# Patient Record
Sex: MALE | Race: WHITE | NOT HISPANIC OR LATINO | URBAN - METROPOLITAN AREA
[De-identification: names, ages, dates, MRNs, and addresses within clinical notes are randomized per-mention and may not be internally consistent; named-entity substitution may affect disease eponyms.]

---

## 2024-04-02 NOTE — PROGRESS NOTES
Subjective   Geraldo Edwards is a 73 y.o. male who presents for the following: Rash (Generalized on body.  Started a few months ago. Comes and goes.  Diagnosed with eczema about 5 years ago by PCP & Dr. Gu.  Currently takes oatmeal baths as needed for itching, Hydroxyzine as needed for itching/sleep, OTC oatmeal cream.  Previously prescribed medrol dose pack and a topical steroid (unsure of name). )      Objective   Well appearing patient in no apparent distress; mood and affect are within normal limits.    A full examination was performed including scalp, head, eyes, ears, nose, lips, neck, chest, axillae, abdomen, back, buttocks, bilateral upper extremities, bilateral lower extremities, hands, feet, fingers, toes, fingernails, and toenails. All findings within normal limits unless otherwise noted below.    Left Upper Back  Erythematous scaly annular plaques on arms, trunk, buttocks, legs, 70% BSA                Assessment/Plan   Tinea corporis    Related Procedures  Follow Up In Dermatology - Established Patient    Related Medications  fluconazole (Diflucan) 150 mg tablet  Take 1 tablet (150 mg) by mouth every 7 days for 28 days.    ketoconazole (NIZOral) 2 % shampoo  Apply topically every other day. Use as body wash daily in shower    Rash and other nonspecific skin eruption  Left Upper Back    Tinea corporis vs CTCL vs annular psoriasis vs other    -Discussed uncertain diagnosis of lesion and recommended biopsy to help clarify diagnosis. -Discussed R/B of procedure including risk of scar. Patient verbalized understanding and agreement with plan for biopsy today.    Most suspicious for tinea corporis. Advised starting ketoconazole 2% shampoo as body wash daily, and fluconazole 150mg once weekly x 4 weeks    Rtc 3 months or earlier prn based on biopsy results    Lesion biopsy - Left Upper Back  Type of biopsy: punch    Informed consent: discussed and consent obtained    Timeout: patient name, date of birth,  surgical site, and procedure verified    Procedure prep:  Patient was prepped and draped  Anesthesia: the lesion was anesthetized in a standard fashion    Anesthetic:  1% lidocaine w/ epinephrine 1-100,000 local infiltration  Punch size:  4 mm  Suture size:  5-0  Suture type: fast-absorbing plain gut    Suture removal (days):  0  Hemostasis achieved with: suture    Outcome: patient tolerated procedure well    Post-procedure details: sterile dressing applied and wound care instructions given    Dressing type: bandage and petrolatum      Specimen 1 - Dermatopathology- DERM LAB  Differential Diagnosis: r/o tinea corporis vs CTCL vs annular psoriasis vs other  Check Margins Yes/No?:    Comments:    Dermpath Lab: Routine Histopathology (formalin-fixed tissue)        Scribe Attestation  By signing my name below, I, Sarah Jules LPN , Scribe   attest that this documentation has been prepared under the direction and in the presence of Beto Craig MD.

## 2024-04-03 ENCOUNTER — OFFICE VISIT (OUTPATIENT)
Dept: DERMATOLOGY | Facility: CLINIC | Age: 74
End: 2024-04-03
Payer: COMMERCIAL

## 2024-04-03 DIAGNOSIS — B35.4 TINEA CORPORIS: Primary | ICD-10-CM

## 2024-04-03 DIAGNOSIS — R21 RASH AND OTHER NONSPECIFIC SKIN ERUPTION: ICD-10-CM

## 2024-04-03 PROCEDURE — 99204 OFFICE O/P NEW MOD 45 MIN: CPT | Performed by: STUDENT IN AN ORGANIZED HEALTH CARE EDUCATION/TRAINING PROGRAM

## 2024-04-03 PROCEDURE — 1159F MED LIST DOCD IN RCRD: CPT | Performed by: STUDENT IN AN ORGANIZED HEALTH CARE EDUCATION/TRAINING PROGRAM

## 2024-04-03 PROCEDURE — 11104 PUNCH BX SKIN SINGLE LESION: CPT | Performed by: STUDENT IN AN ORGANIZED HEALTH CARE EDUCATION/TRAINING PROGRAM

## 2024-04-03 PROCEDURE — 88312 SPECIAL STAINS GROUP 1: CPT | Performed by: DERMATOLOGY

## 2024-04-03 PROCEDURE — 88305 TISSUE EXAM BY PATHOLOGIST: CPT | Performed by: DERMATOLOGY

## 2024-04-03 RX ORDER — GLIMEPIRIDE 4 MG/1
4 TABLET ORAL
COMMUNITY
Start: 2023-06-01

## 2024-04-03 RX ORDER — ASPIRIN 81 MG/1
81 TABLET ORAL ONCE
COMMUNITY
Start: 2006-05-18

## 2024-04-03 RX ORDER — FLASH GLUCOSE SENSOR
11 KIT MISCELLANEOUS AS NEEDED
COMMUNITY
Start: 2024-03-29

## 2024-04-03 RX ORDER — METFORMIN HYDROCHLORIDE 750 MG/1
750 TABLET, EXTENDED RELEASE ORAL
COMMUNITY
Start: 2023-06-01

## 2024-04-03 RX ORDER — LISINOPRIL 2.5 MG/1
2.5 TABLET ORAL
COMMUNITY
Start: 2023-06-01

## 2024-04-03 RX ORDER — FLUCONAZOLE 150 MG/1
150 TABLET ORAL
Qty: 4 TABLET | Refills: 0 | Status: SHIPPED | OUTPATIENT
Start: 2024-04-03 | End: 2024-05-01

## 2024-04-03 RX ORDER — KETOCONAZOLE 20 MG/ML
SHAMPOO, SUSPENSION TOPICAL EVERY OTHER DAY
Qty: 120 ML | Refills: 3 | Status: SHIPPED | OUTPATIENT
Start: 2024-04-03 | End: 2024-04-03 | Stop reason: SDUPTHER

## 2024-04-03 RX ORDER — ATORVASTATIN CALCIUM 10 MG/1
10 TABLET, FILM COATED ORAL DAILY
COMMUNITY
Start: 2023-06-01

## 2024-04-03 RX ORDER — HYDROXYZINE HYDROCHLORIDE 25 MG/1
25 TABLET, FILM COATED ORAL AS NEEDED
COMMUNITY
Start: 2024-03-29

## 2024-04-03 RX ORDER — DM/P-EPHED/ACETAMINOPH/DOXYLAM 30-7.5/3
2 LIQUID (ML) ORAL
COMMUNITY
Start: 2024-03-29 | End: 2024-06-27

## 2024-04-03 RX ORDER — NICOTINE 7MG/24HR
1 PATCH, TRANSDERMAL 24 HOURS TRANSDERMAL EVERY 24 HOURS
COMMUNITY
Start: 2024-03-29 | End: 2024-09-25

## 2024-04-03 ASSESSMENT — DERMATOLOGY QUALITY OF LIFE (QOL) ASSESSMENT
WHAT SINGLE SKIN CONDITION LISTED BELOW IS THE PATIENT ANSWERING THE QUALITY-OF-LIFE ASSESSMENT QUESTIONS ABOUT: DERMATITIS
RATE HOW BOTHERED YOU ARE BY SYMPTOMS OF YOUR SKIN PROBLEM (EG, ITCHING, STINGING BURNING, HURTING OR SKIN IRRITATION): 5
ARE THERE EXCLUSIONS OR EXCEPTIONS FOR THE QUALITY OF LIFE ASSESSMENT: NO
DATE THE QUALITY-OF-LIFE ASSESSMENT WAS COMPLETED: 66933
RATE HOW EMOTIONALLY BOTHERED YOU ARE BY YOUR SKIN PROBLEM (FOR EXAMPLE, WORRY, EMBARRASSMENT, FRUSTRATION): 5
RATE HOW BOTHERED YOU ARE BY EFFECTS OF YOUR SKIN PROBLEMS ON YOUR ACTIVITIES (EG, GOING OUT, ACCOMPLISHING WHAT YOU WANT, WORK ACTIVITIES OR YOUR RELATIONSHIPS WITH OTHERS): 5

## 2024-04-03 ASSESSMENT — DERMATOLOGY PATIENT ASSESSMENT
ARE YOU AN ORGAN TRANSPLANT RECIPIENT: NO
DO YOU USE A TANNING BED: NO
HAVE YOU HAD OR DO YOU HAVE VASCULAR DISEASE: NO
DO YOU HAVE ANY NEW OR CHANGING LESIONS: NO
HAVE YOU HAD OR DO YOU HAVE A STAPH INFECTION: NO

## 2024-04-03 ASSESSMENT — PATIENT GLOBAL ASSESSMENT (PGA): PATIENT GLOBAL ASSESSMENT: PATIENT GLOBAL ASSESSMENT:  3 - MODERATE

## 2024-04-03 ASSESSMENT — ITCH NUMERIC RATING SCALE: HOW SEVERE IS YOUR ITCHING?: 7

## 2024-04-03 NOTE — Clinical Note
"The pharmacy requested a new script.  The original stated \"apply topically every other day. Use as a body wash daily in shower\"  Thank you!"

## 2024-04-08 DIAGNOSIS — B35.4 TINEA CORPORIS: ICD-10-CM

## 2024-04-08 LAB
LABORATORY COMMENT REPORT: NORMAL
PATH REPORT.FINAL DX SPEC: NORMAL
PATH REPORT.GROSS SPEC: NORMAL
PATH REPORT.RELEVANT HX SPEC: NORMAL
PATH REPORT.TOTAL CANCER: NORMAL

## 2024-04-09 RX ORDER — KETOCONAZOLE 20 MG/ML
SHAMPOO, SUSPENSION TOPICAL
Qty: 120 ML | Refills: 3 | Status: SHIPPED | OUTPATIENT
Start: 2024-04-09

## 2024-04-10 NOTE — RESULT ENCOUNTER NOTE
Eczematous pattern. This can be seen in setting of fungal infection or environmental or other sensitivities.   With anti-fungal regimen, how is he doing? If improving, continue current regimen.   If not improving, can start TAC 0.1% cream BID to affected areas and should RTC in next 2-6 weeks for re-evaluation.

## 2024-04-11 DIAGNOSIS — R21 RASH AND OTHER NONSPECIFIC SKIN ERUPTION: ICD-10-CM

## 2024-04-11 RX ORDER — FLUCONAZOLE 150 MG/1
150 TABLET ORAL
Qty: 4 TABLET | Refills: 0 | OUTPATIENT
Start: 2024-04-11 | End: 2024-05-09

## 2024-04-11 RX ORDER — TRIAMCINOLONE ACETONIDE 1 MG/G
CREAM TOPICAL
Qty: 454 G | Refills: 1 | Status: SHIPPED | OUTPATIENT
Start: 2024-04-11

## 2024-04-11 NOTE — RESULT ENCOUNTER NOTE
Called and spoke with pt to discuss results.  Pt states he is still itchy, confirms he is using ketoconazole as a body was (has used 2 times so far) and has taken Diflucan x 1 (due today).  Pt would like Triamcinolone as he has not improved.  Pt requests that we reached out to Shanna Edwards (on file) to schedule follow up.  Sarah Jules LPN

## 2024-04-26 DIAGNOSIS — B35.4 TINEA CORPORIS: ICD-10-CM

## 2024-04-30 RX ORDER — FLUCONAZOLE 150 MG/1
150 TABLET ORAL
Qty: 4 TABLET | Refills: 0 | OUTPATIENT
Start: 2024-04-30 | End: 2024-05-28

## 2024-05-07 ENCOUNTER — TELEPHONE (OUTPATIENT)
Dept: DERMATOLOGY | Facility: CLINIC | Age: 74
End: 2024-05-07
Payer: COMMERCIAL

## 2024-05-07 NOTE — TELEPHONE ENCOUNTER
Left another voice mail for pt to assist with scheduling a follow up appointment for rash.  Pt requested previously that we speak with his ex-wife Shanna to schedule, but phone number on file is disconnected.  Pt notified of this also and phone number for nurse line provided.  Sarah Jules LPN

## 2024-07-04 DIAGNOSIS — B35.4 TINEA CORPORIS: ICD-10-CM

## 2024-07-09 RX ORDER — FLUCONAZOLE 150 MG/1
150 TABLET ORAL
Qty: 4 TABLET | Refills: 0 | OUTPATIENT
Start: 2024-07-09 | End: 2024-08-06

## 2024-07-10 ENCOUNTER — APPOINTMENT (OUTPATIENT)
Dept: DERMATOLOGY | Facility: CLINIC | Age: 74
End: 2024-07-10
Payer: COMMERCIAL

## 2024-07-10 DIAGNOSIS — B35.4 TINEA CORPORIS: ICD-10-CM

## 2024-07-10 DIAGNOSIS — R21 RASH AND OTHER NONSPECIFIC SKIN ERUPTION: Primary | ICD-10-CM

## 2024-07-10 PROCEDURE — 1159F MED LIST DOCD IN RCRD: CPT | Performed by: STUDENT IN AN ORGANIZED HEALTH CARE EDUCATION/TRAINING PROGRAM

## 2024-07-10 PROCEDURE — 99213 OFFICE O/P EST LOW 20 MIN: CPT | Performed by: STUDENT IN AN ORGANIZED HEALTH CARE EDUCATION/TRAINING PROGRAM

## 2024-07-10 NOTE — PROGRESS NOTES
Subjective   Geraldo Edwards is a 73 y.o. male who presents for the following: Rash (LV: 4/3/24: Currently using TAC 0.1% cream BID to affected areas. Patient reports improvement. ) rash  has completely resolved.     He denies any med changes prior to rash. No new personal care products. No recent illness. No known contact allergies. He is not sure what may have triggered rash    Dermatopathology- DERM LAB: [id number]  Order: [id number]   Collected 4/3/2024 10:03       Status: Final result       Visible to patient: No (inaccessible in  [facility])       Dx: Rash and other nonspecific skin eruption    2 Result Notes      Component    FINAL DIAGNOSIS   SKIN, LEFT UPPER BACK, PUNCH BIOPSY:  SUBACUTE SPONGIOTIC DERMATITIS, SEE NOTE.     Note: Microscopic examination reveals a specimen that extends into the deep reticular dermis. There is parakeratosis with serum crust and neutrophils. There is mild spongiosis of the epidermis with a mild superficial perivascular lymphocytic infiltrate with occasional eosinophils.  A PAS stain is negative for fungus. All control slides stain appropriately.     The differential diagnosis includes atopic dermatitis, nummular dermatitis, allergic contact dermatitis, id reaction, and eczematous drug reaction.           Objective   Well appearing patient in no apparent distress; mood and affect are within normal limits.    A full examination was performed including scalp, head, eyes, ears, nose, lips, neck, chest, axillae, abdomen, back, buttocks, bilateral upper extremities, bilateral lower extremities, hands, feet, fingers, toes, fingernails, and toenails. All findings within normal limits unless otherwise noted below.    Left Upper Back  Hyperpigmentation in areas of prior rash. No remaining erythema        Assessment/Plan   Rash and other nonspecific skin eruption  Left Upper Back    Eczematous dermatitis  -resolved   -unclear trigger at this time. Given how abrupt and widespread, we discussed  possible drug or viral trigger, but he does not recall any medications or illness around that time. Perhaps subclinical viral illness? Contact allergen would have to have been widespread such as soap, but he has not changed any personal care products though rash is resolved (indicating allergen is not likely still in environment). If rash recurs, keep diary of exposures/hobbies/products use and let us know so we can review.     Rtc prn        Scribe Attestation  By signing my name below, I, Sarah Jules LPN , Scribe   attest that this documentation has been prepared under the direction and in the presence of Beto Craig MD.

## 2024-09-13 DIAGNOSIS — B35.4 TINEA CORPORIS: ICD-10-CM

## 2024-09-18 RX ORDER — FLUCONAZOLE 150 MG/1
150 TABLET ORAL
Qty: 4 TABLET | Refills: 0 | OUTPATIENT
Start: 2024-09-18 | End: 2024-10-16